# Patient Record
Sex: MALE | Race: WHITE | NOT HISPANIC OR LATINO | Employment: OTHER | URBAN - NONMETROPOLITAN AREA
[De-identification: names, ages, dates, MRNs, and addresses within clinical notes are randomized per-mention and may not be internally consistent; named-entity substitution may affect disease eponyms.]

---

## 2020-01-07 ENCOUNTER — OFFICE VISIT (OUTPATIENT)
Dept: CARDIOLOGY | Facility: CLINIC | Age: 65
End: 2020-01-07

## 2020-01-07 VITALS
WEIGHT: 191.2 LBS | HEIGHT: 68 IN | HEART RATE: 67 BPM | OXYGEN SATURATION: 95 % | DIASTOLIC BLOOD PRESSURE: 70 MMHG | BODY MASS INDEX: 28.98 KG/M2 | SYSTOLIC BLOOD PRESSURE: 112 MMHG

## 2020-01-07 DIAGNOSIS — R06.02 SHORTNESS OF BREATH: ICD-10-CM

## 2020-01-07 DIAGNOSIS — I87.2 VENOUS INSUFFICIENCY OF BOTH LOWER EXTREMITIES: ICD-10-CM

## 2020-01-07 DIAGNOSIS — I10 ESSENTIAL HYPERTENSION: ICD-10-CM

## 2020-01-07 DIAGNOSIS — Z94.0 KIDNEY TRANSPLANT STATUS, LIVING UNRELATED DONOR: ICD-10-CM

## 2020-01-07 DIAGNOSIS — R00.2 PALPITATIONS: Primary | ICD-10-CM

## 2020-01-07 DIAGNOSIS — D68.62 LA (LUPUS ANTICOAGULANT) DISORDER (HCC): ICD-10-CM

## 2020-01-07 PROCEDURE — 99204 OFFICE O/P NEW MOD 45 MIN: CPT | Performed by: NURSE PRACTITIONER

## 2020-01-07 RX ORDER — IRBESARTAN 300 MG/1
75-150 TABLET ORAL NIGHTLY
COMMUNITY

## 2020-01-07 RX ORDER — SIMVASTATIN 80 MG
TABLET ORAL NIGHTLY
COMMUNITY

## 2020-01-07 RX ORDER — SIROLIMUS 2 MG/1
2 TABLET, FILM COATED ORAL DAILY
COMMUNITY

## 2020-01-07 RX ORDER — PRAVASTATIN SODIUM 40 MG
40 TABLET ORAL DAILY
COMMUNITY
End: 2020-01-07 | Stop reason: ALTCHOICE

## 2020-01-07 NOTE — PROGRESS NOTES
Subjective:     Encounter Date:01/07/2020    Chief Complaint:    Patient ID: Harrsion Perry is a 64 y.o. male here today for cardiac evaluation at the request of Dr. Narayan for new onset atrial fibrillation.    Feels irregular (hard) heartbeat usually when he is lying down for the last 6 months or so. Sometimes feels fast at rest and with activity. Some SOA and dizziness. Feels like balance has been off for over a year. Has been on warfarin for lupus anticoagulant. Hx CVA 30 years ago. Had an echocardiogram 30 years ago - no PFO. Had kidney transplant 2004.    Atrial Fibrillation   Presents for initial visit. Symptoms include dizziness, hypertension, palpitations, shortness of breath and tachycardia. Symptoms are negative for chest pain, hemodynamic instability and syncope. The symptoms have been stable. Past treatments include beta blockers and warfarin. Compliance with prior treatments has been good. Past medical history includes atrial fibrillation and HTN. There is no history of CABG/stent, CAD, CHF, hyperlipidemia and valvular heart disease.     History:   Past Medical History:   Diagnosis Date   • Benign prostatic hyperplasia with lower urinary tract symptoms    • Cyst of kidney, acquired    • Depression    • Dyshidrosis    • Edema    • Erectile dysfunction    • Hyperlipidemia    • Hypertension    • LA (lupus anticoagulant) disorder (CMS/HCC)    • Parathyroid disease (CMS/HCC)    • Pleurodynia    • Polycythemia    • Restless legs syndrome    • Stroke (CMS/HCC)      Past Surgical History:   Procedure Laterality Date   • CHOLECYSTECTOMY     • PARATHYROIDECTOMY      x2, [facility] and [facility]   • TRANSPLANTATION RENAL  07/2004    Dr. Marlene Bertrand     Social History     Socioeconomic History   • Marital status: Single     Spouse name: Not on file   • Number of children: Not on file   • Years of education: Not on file   • Highest education level: Not on file   Tobacco Use   • Smoking status: Current Every Day  Smoker     Years: 46.00     Types: Pipe, Cigarettes, Cigars     Start date: 1972   • Smokeless tobacco: Never Used   • Tobacco comment: smoked cigs 15 years less than 1 ppd - quit cigs (doesn't know when), smoked/chewed cigars for years and changed to pipe in 2019, smokes pipe 3 times per day. Has quit all tobacco for up to a year once.   Substance and Sexual Activity   • Alcohol use: Yes     Frequency: Never     Comment: rarely   • Drug use: Never   • Sexual activity: Defer     Family History   Problem Relation Age of Onset   • Heart disease Father    • Heart attack Father    • Heart failure Mother    • Lung cancer Maternal Grandfather         oat cell   • Heart attack Paternal Grandfather    • Heart disease Paternal Grandfather        Outpatient Medications Marked as Taking for the 1/7/20 encounter (Office Visit) with Agueda Soto APRN   Medication Sig Dispense Refill   • amitriptyline (ELAVIL) 100 MG tablet Take 100 mg by mouth Every Night.     • Calcium Carbonate-Vitamin D 600-125 MG-UNIT tablet Take 1 tablet by mouth Daily.     • famotidine (PEPCID) 20 MG tablet Take 20 mg by mouth 2 (Two) Times a Day.     • irbesartan (AVAPRO) 300 MG tablet Take 75 mg by mouth Every Night.     • mycophenolate (CELLCEPT) 500 MG tablet Take 500 mg by mouth 2 (Two) Times a Day.     • predniSONE (DELTASONE) 5 MG tablet Take 5 mg by mouth Daily.     • SIMVASTATIN PO Take  by mouth Every Night.     • sirolimus (RAPAMUNE) 2 MG tablet Take 2 mg by mouth Daily.     • warfarin (COUMADIN) 5 MG tablet Take 5 mg by mouth Daily.     • [DISCONTINUED] carvedilol (COREG) 6.25 MG tablet Take 1.625 mg by mouth 2 (Two) Times a Day With Meals.     • [DISCONTINUED] pravastatin (PRAVACHOL) 40 MG tablet Take 40 mg by mouth Daily.     Didn't bring updated med list or meds. Not sure on meds/doses.    Review of Systems:  Review of Systems   Constitution: Positive for malaise/fatigue. Negative for chills and fever.   HENT: Negative for  "congestion and nosebleeds.    Eyes: Negative for blurred vision and double vision.   Cardiovascular: Positive for dyspnea on exertion, irregular heartbeat, leg swelling (R worse than L and if sitting or standing) and palpitations. Negative for chest pain, near-syncope and syncope.   Respiratory: Positive for cough (from a cold recently but normally doesn't cough), shortness of breath and sputum production (white to light yellow, improving). Negative for snoring.    Hematologic/Lymphatic: Bruises/bleeds easily (bruises easily on warfarin and prednisone).   Skin: Negative for rash.   Musculoskeletal: Positive for arthritis, back pain and joint pain.   Gastrointestinal: Negative for abdominal pain, constipation, diarrhea, heartburn, hematemesis, hematochezia, hemorrhoids and melena.   Genitourinary: Negative for hematuria and nocturia.   Neurological: Positive for dizziness, light-headedness and loss of balance. Negative for excessive daytime sleepiness and headaches.   Psychiatric/Behavioral: Negative for depression. The patient does not have insomnia and is not nervous/anxious.         Objective:   /70 (BP Location: Right arm, Patient Position: Sitting, Cuff Size: Adult)   Pulse 67   Ht 172.7 cm (68\")   Wt 86.7 kg (191 lb 3.2 oz)   SpO2 95%   BMI 29.07 kg/m²   Wt Readings from Last 3 Encounters:   01/07/20 86.7 kg (191 lb 3.2 oz)        Physical Exam   Constitutional: He is oriented to person, place, and time. He appears well-developed and well-nourished.   HENT:   Head: Normocephalic and atraumatic.   Right Ear: External ear normal.   Left Ear: External ear normal.   Nose: Nose normal.   Mouth/Throat: Oropharynx is clear and moist.   Eyes: Pupils are equal, round, and reactive to light. Conjunctivae and EOM are normal. Right eye exhibits no discharge. Left eye exhibits no discharge. No scleral icterus.   Neck: Normal range of motion. Neck supple. No JVD present. No tracheal deviation present. No " thyromegaly present.   Cardiovascular: Normal rate and regular rhythm. Frequent extrasystoles are present. Exam reveals no gallop and no friction rub.   No murmur heard.  Pulmonary/Chest: Effort normal and breath sounds normal. He has no wheezes. He has no rales.   Abdominal: Soft. Bowel sounds are normal. He exhibits no mass. There is no tenderness. There is no rebound and no guarding.   Musculoskeletal: He exhibits no edema, tenderness or deformity.   Lymphadenopathy:     He has no cervical adenopathy.   Neurological: He is alert and oriented to person, place, and time. No cranial nerve deficit.   Skin: Skin is dry.   Changes of chronic venous insufficiency to bilateral lower extremities.  Hands are cool.   Psychiatric: He has a normal mood and affect. His behavior is normal. Judgment and thought content normal.   Vitals reviewed.    Lab/Diagnostics Review:   11/18/2019 EKG [facility] sinus tachycardia with frequent PACs 106 bpm per my review no previous available for comparison.    No labs or previous cardiac studies available for review    Procedures: none in office today        Assessment/Plan:         Problem List Items Addressed This Visit        Cardiovascular and Mediastinum    Palpitations - Primary    Current Assessment & Plan     Check extended Holter to determine if any PAF and rate control and check echocardiogram. EKG from [facility] appears to be sinus tachycardia with frequent PACs. He is already anticoagulated with warfarin for lupus anticoagulant.  Requested copy of labs for review.  We will need to check TSH if not done recently.         Relevant Orders    Adult Transthoracic Echo Complete W/ Cont if Necessary Per Protocol    Holter Monitor - 72 Hour Up To 21 Days    Hypertension    Current Assessment & Plan     Well controlled with irbesartan. Continue present therapy.           Relevant Medications    irbesartan (AVAPRO) 300 MG tablet    Venous insufficiency of both lower extremities       Immune and  Lymphatic    LA (lupus anticoagulant) disorder (CMS/HCC)       Other    Kidney transplant status, living unrelated donor      Other Visit Diagnoses     Shortness of breath        Relevant Orders    Adult Transthoracic Echo Complete W/ Cont if Necessary Per Protocol        Return in about 2 months (around 3/7/2020) for Recheck.           Agueda Soto, APRN, ACNP-BC, CHFN-BC

## 2020-01-07 NOTE — ASSESSMENT & PLAN NOTE
Check extended Holter to determine if any PAF and rate control and check echocardiogram. EKG from [facility] appears to be sinus tachycardia with frequent PACs. He is already anticoagulated with warfarin for lupus anticoagulant.  Requested copy of labs for review.  We will need to check TSH if not done recently.

## 2020-01-10 DIAGNOSIS — R00.2 PALPITATIONS: Primary | ICD-10-CM

## 2020-01-12 ENCOUNTER — RESULTS ENCOUNTER (OUTPATIENT)
Dept: CARDIOLOGY | Facility: CLINIC | Age: 65
End: 2020-01-12

## 2020-01-12 DIAGNOSIS — R00.2 PALPITATIONS: ICD-10-CM

## 2020-01-13 ENCOUNTER — OUTSIDE FACILITY SERVICE (OUTPATIENT)
Dept: CARDIOLOGY | Facility: CLINIC | Age: 65
End: 2020-01-13

## 2020-01-13 PROCEDURE — 93306 TTE W/DOPPLER COMPLETE: CPT | Performed by: INTERNAL MEDICINE

## 2020-01-31 DIAGNOSIS — R07.89 OTHER CHEST PAIN: Primary | ICD-10-CM

## 2020-01-31 DIAGNOSIS — I47.1 PAROXYSMAL SVT (SUPRAVENTRICULAR TACHYCARDIA) (HCC): ICD-10-CM

## 2020-02-17 ENCOUNTER — OUTSIDE FACILITY SERVICE (OUTPATIENT)
Dept: CARDIOLOGY | Facility: CLINIC | Age: 65
End: 2020-02-17

## 2020-02-17 PROCEDURE — 93350 STRESS TTE ONLY: CPT | Performed by: INTERNAL MEDICINE

## 2020-02-17 PROCEDURE — 93018 CV STRESS TEST I&R ONLY: CPT | Performed by: INTERNAL MEDICINE

## 2020-02-18 DIAGNOSIS — R07.89 OTHER CHEST PAIN: ICD-10-CM

## 2020-03-10 ENCOUNTER — OFFICE VISIT (OUTPATIENT)
Dept: CARDIOLOGY | Facility: CLINIC | Age: 65
End: 2020-03-10

## 2020-03-10 VITALS
HEART RATE: 65 BPM | OXYGEN SATURATION: 98 % | WEIGHT: 195.8 LBS | SYSTOLIC BLOOD PRESSURE: 104 MMHG | DIASTOLIC BLOOD PRESSURE: 80 MMHG | HEIGHT: 68 IN | BODY MASS INDEX: 29.67 KG/M2

## 2020-03-10 DIAGNOSIS — R00.2 PALPITATIONS: ICD-10-CM

## 2020-03-10 DIAGNOSIS — I87.2 VENOUS INSUFFICIENCY OF BOTH LOWER EXTREMITIES: ICD-10-CM

## 2020-03-10 DIAGNOSIS — I47.1 PAROXYSMAL SVT (SUPRAVENTRICULAR TACHYCARDIA) (HCC): Primary | ICD-10-CM

## 2020-03-10 DIAGNOSIS — R07.89 OTHER CHEST PAIN: ICD-10-CM

## 2020-03-10 DIAGNOSIS — I47.29 NONSUSTAINED VENTRICULAR TACHYCARDIA (HCC): ICD-10-CM

## 2020-03-10 DIAGNOSIS — I10 ESSENTIAL HYPERTENSION: ICD-10-CM

## 2020-03-10 PROCEDURE — 93000 ELECTROCARDIOGRAM COMPLETE: CPT | Performed by: NURSE PRACTITIONER

## 2020-03-10 PROCEDURE — 99213 OFFICE O/P EST LOW 20 MIN: CPT | Performed by: NURSE PRACTITIONER

## 2020-03-10 RX ORDER — OMEPRAZOLE 20 MG/1
20 CAPSULE, DELAYED RELEASE ORAL DAILY
COMMUNITY

## 2020-03-10 NOTE — ASSESSMENT & PLAN NOTE
Well controlled with irbesartan. May need to stop irbesartan and restart beta-blocker. Will await Dr. Major's recommendations later this week.

## 2020-03-10 NOTE — PATIENT INSTRUCTIONS
Supraventricular Tachycardia, Adult  Supraventricular tachycardia (SVT) is a type of abnormal heart rhythm. It causes the heart to beat very quickly and then return to a normal speed.  A normal resting heart rate is  beats per minute. During an episode of SVT, your heart rate may be higher than 150 beats per minute. Episodes of SVT can be frightening, but they are usually not dangerous. However, if episodes happen several times per day or last longer than a few seconds, they may lead to heart failure.  What are the causes?    Usually, a normal heartbeat starts when an area called the sinoatrial node releases an electrical signal. In SVT, other areas of the heart send out electrical signals that interfere with the signal from the sinoatrial node. It is not known why some people get SVT and others do not.  What increases the risk?  You are more likely to develop this condition if you are:  · 12-30 years old.  · A woman.  The following factors may make you more likely to develop this condition:  · Stress.  · Tiredness.  · Smoking.  · Stimulant drugs, such as cocaine and methamphetamine.  · Alcohol.  · Caffeine.  · Pregnancy.  · Anxiety.  What are the signs or symptoms?  Symptoms of this condition include:  · A pounding heart.  · A feeling that the heart is skipping beats (palpitations).  · Weakness.  · Shortness of breath.  · Tightness or pain in your chest.  · Light-headedness.  · Anxiety.  · Dizziness.  · Sweating.  · Nausea.  · Fainting.  · Fatigue or tiredness.  A mild episode may not cause symptoms.  How is this diagnosed?  This condition may be diagnosed based on:  · Your symptoms.  · A physical exam.  ? If you have an episode of SVT during the exam, the health care provider may be able to diagnose SVT by listening to your heart and feeling your pulse.  · Tests. These may include:  ? An electrocardiogram (ECG). This test is done to check for problems with electrical activity in the heart.  ? A Holter  monitor or event monitor test. This test involves wearing a portable device that monitors your heart rate over time.  ? An echocardiogram. This test involves taking an image of your heart using sound waves. It is done to rule out other causes of a fast heart rate.  ? Blood tests.  How is this treated?  This condition may be treated with:  · Vagal nerve stimulation. The treatment involves stimulating your vagus nerve, which slows down the heart. It is often the first and only treatment that is needed for this condition. Work with your health care provider to find which one works best for you. Ways to do this treatment include:  ? Holding your breath and pushing, as though you are having a bowel movement.  ? Massaging an area on one side of your neck, below your jaw. Do not try this yourself. Only a health care provider should do this. If done the wrong way, it can lead to a stroke.  ? Bending forward with your head between your legs.  ? Coughing while bending forward with your head between your legs.  ? Closing your eyes and massaging your eyeballs. A health care provider should guide you through this method before you try it on your own.  · Medicines that prevent attacks.  · Medicine to stop an attack. The medicine is given through an IV at the hospital.  · A small electric shock (cardioversion) that stops an attack. Before you get the shock, you will get medicine to make you fall asleep.  · Radiofrequency ablation. In this procedure, a small, thin tube (catheter) is used to send radiofrequency energy to the area of tissue that is causing the rapid heartbeats. The energy kills the cells and helps your heart keep a normal rhythm. You may have this treatment if you have symptoms of SVT often.  If you do not have symptoms, you may not need treatment.  Follow these instructions at home:  Stress  · Avoid stressful situations when possible.  · Find healthy ways of managing stress that work for you. Some healthy ways to  manage stress include:  ? Taking part in relaxing activities, such as yoga, meditation, or being out in nature.  ? Listening to relaxing music.  ? Practicing relaxation techniques, such as deep breathing.  ? Leading a healthy lifestyle. This involves getting plenty of sleep, exercising, and eating a balanced diet.  ? Attending counseling or talk therapy with a mental health professional.  Lifestyle    · Try to get at least 7 hours of sleep each night.  · Do not use any products that contain nicotine or tobacco, such as cigarettes, e-cigarettes, and chewing tobacco. If you need help quitting, ask your health care provider.  · Be aware of how alcohol affects your condition. If alcohol:  ? Triggers episodes of SVT, do not drink alcohol.  ? Does not seem to trigger episodes, limit alcohol intake to no more than 1 drink a day for nonpregnant women and 2 drinks a day for men. Be aware of how much alcohol is in your drink. In the [place] one drink equals one 12 oz bottle of beer (355 mL), one 5 oz glass of wine (148 mL), or one 1½ oz glass of hard liquor (44 mL).  · Be aware of how caffeine affects your condition. If caffeine:  ? Triggers episodes of SVT, do not eat, drink, or use anything with caffeine in it.  ? Does not seem to trigger episodes, consume caffeine in moderation.  · Do not use stimulant drugs. If you need help quitting, talk with your health care provider.  General instructions  · Maintain a healthy weight.  · Exercise regularly. Ask your health care provider to suggest some good activities for you. Aim for one or a combination of the following:  ? 150 minutes per week of moderate exercise, such as walking or yoga.  ? 75 minutes per week of vigorous exercise, such as running or swimming.  · Perform vagus nerve stimulation as directed by your health care provider.  · Take over-the-counter and prescription medicines only as told by your health care provider.  · Keep all follow-up visits as told by your health  care provider. This is important.  Contact a health care provider if:  · You have episodes of SVT more often than before.  · Episodes of SVT last longer than before.  · Vagus nerve stimulation is no longer helping.  · You have new symptoms.  Get help right away if:  · You have chest pain.  · Your symptoms get worse.  · You have trouble breathing.  · You have an episode of SVT that lasts longer than 20 minutes.  · You faint.  These symptoms may represent a serious problem that is an emergency. Do not wait to see if the symptoms will go away. Get medical help right away. Call your local emergency services (911 in the [place] Do not drive yourself to the hospital.  Summary  · Supraventricular tachycardia (SVT) is a type of abnormal heart rhythm.  · During an episode of SVT, your heart rate may be higher than 150 beats per minute.  · Treatment depends on frequency of occurrence and symptoms experienced.  This information is not intended to replace advice given to you by your health care provider. Make sure you discuss any questions you have with your health care provider.  Document Released: 12/18/2006 Document Revised: 11/05/2019 Document Reviewed: 11/05/2019  [employer] Interactive Patient Education © 2020 [employer]

## 2020-03-10 NOTE — ASSESSMENT & PLAN NOTE
Due to PACs, SVT, and NSVT. Await Dr. Major's recommendations. Avoid stimulants, decongestants. Check TSH as previously ordered.

## 2020-03-10 NOTE — ASSESSMENT & PLAN NOTE
Await Dr. Major's recommendations. Avoid stimulants, decongestants. Check TSH as previously ordered. Reprinted order. Provided education.

## 2020-03-10 NOTE — PROGRESS NOTES
Subjective:     Encounter Date:03/10/2020    Chief Complaint:    Patient ID: Harrison Perry is a 64 y.o. male here today for 2 month cardiac follow-up.  He does not know if he had TSH done as ordered in January.    HPI     Palpitations      Additional comments: Numerous episodes of SVT per extended Holter. Sees Dr. Major Friday at [facility], his kidney transplant specialist (Dr. Rosario) is reportedly ok with stopping ARB and restarting carvedilol - had fewer palpitations while on carvedilol but decreased due to hypotension (100/50s). SE and echo were unremarkable.          Last edited by Agueda Soto APRN on 3/10/2020  1:12 PM. (History)        History:   Past Medical History:   Diagnosis Date   • Benign prostatic hyperplasia with lower urinary tract symptoms    • Cyst of kidney, acquired    • Depression    • Dyshidrosis    • Edema    • Erectile dysfunction    • Hyperlipidemia    • Hypertension    • LA (lupus anticoagulant) disorder (CMS/HCC)    • Nonsustained ventricular tachycardia (CMS/HCC)    • Parathyroid disease (CMS/HCC)    • Pleurodynia    • Polycythemia    • PSVT (paroxysmal supraventricular tachycardia) (CMS/HCC)    • Restless legs syndrome    • Stroke (CMS/HCC)      Past Surgical History:   Procedure Laterality Date   • CHOLECYSTECTOMY     • PARATHYROIDECTOMY      x2, [facility] and [facility]   • [place]     Social History     Socioeconomic History   • Marital status: Single     Spouse name: Not on file   • Number of children: Not on file   • Years of education: Not on file   • Highest education level: Not on file   Tobacco Use   • Smoking status: Current Every Day Smoker     Years: 46.00     Types: Pipe, Cigarettes, Cigars     Start date: 1972   • Smokeless tobacco: Never Used   • Tobacco comment: smoked cigs 15 years less than 1 ppd - quit cigs (doesn't know when), smoked/chewed cigars for years and changed to pipe in 2019, smokes pipe 3 times per day.  Has quit all tobacco for up to a year once.   Substance and Sexual Activity   • Alcohol use: Not Currently     Frequency: Never     Comment: rarely in the past   • Drug use: Never   • Sexual activity: Defer     Family History   Problem Relation Age of Onset   • Heart disease Father    • Heart attack Father    • Heart failure Mother    • Lung cancer Maternal Grandfather         oat cell   • Heart attack Paternal Grandfather    • Heart disease Paternal Grandfather      Outpatient Medications Marked as Taking for the 3/10/20 encounter (Office Visit) with Agueda Soto APRN   Medication Sig Dispense Refill   • amitriptyline (ELAVIL) 100 MG tablet Take 100 mg by mouth Every Night.     • Calcium Carbonate-Vitamin D 600-125 MG-UNIT tablet Take 1 tablet by mouth Daily.     • irbesartan (AVAPRO) 300 MG tablet Take 75 mg by mouth Every Night.     • mycophenolate (CELLCEPT) 500 MG tablet Take 500 mg by mouth 2 (Two) Times a Day.     • omeprazole (priLOSEC) 20 MG capsule Take 20 mg by mouth Daily.     • predniSONE (DELTASONE) 5 MG tablet Take 5 mg by mouth Daily.     • SIMVASTATIN PO Take  by mouth Every Night.     • sirolimus (RAPAMUNE) 2 MG tablet Take 2 mg by mouth Daily.     • warfarin (COUMADIN) 4 MG tablet Take 4 mg by mouth Daily. Regulated by Dr. Leonid Narayan     • [DISCONTINUED] famotidine (PEPCID) 20 MG tablet Take 20 mg by mouth 2 (Two) Times a Day.       Review of Systems:  Review of Systems   Constitution: Positive for malaise/fatigue (unchanged). Negative for chills, fever and weight gain.   HENT: Negative for congestion and nosebleeds.    Eyes: Negative for blurred vision and double vision.   Cardiovascular: Positive for chest pain (random, similar to indigestion/GERD - low risk SE), dyspnea on exertion (walking a distance or carrying heavy object), irregular heartbeat, leg swelling (R worse than L and if sitting or standing) and palpitations. Negative for near-syncope, orthopnea, paroxysmal nocturnal  "dyspnea and syncope.   Respiratory: Positive for cough (from a cold recently but normally doesn't cough) and shortness of breath. Negative for snoring and sputum production.    Hematologic/Lymphatic: Bruises/bleeds easily (bruises easily on warfarin and prednisone).   Skin: Negative for rash.   Musculoskeletal: Positive for arthritis, back pain and joint pain.   Gastrointestinal: Negative for abdominal pain, constipation, diarrhea, heartburn, hematemesis, hematochezia, hemorrhoids and melena.   Genitourinary: Negative for hematuria and nocturia.   Neurological: Positive for dizziness (when first stands up) and loss of balance. Negative for excessive daytime sleepiness, headaches and light-headedness.   Psychiatric/Behavioral: Positive for memory loss (mild). Negative for depression. The patient does not have insomnia and is not nervous/anxious.         Objective:   /80 (BP Location: Left arm, Patient Position: Sitting, Cuff Size: Adult)   Pulse 65   Ht 172.7 cm (68\")   Wt 88.8 kg (195 lb 12.8 oz)   SpO2 98%   BMI 29.77 kg/m²   Wt Readings from Last 3 Encounters:   03/10/20 88.8 kg (195 lb 12.8 oz)   01/07/20 86.7 kg (191 lb 3.2 oz)       Physical Exam   Constitutional: He is oriented to person, place, and time. He appears well-developed and well-nourished.   Eyes: No scleral icterus.   Neck: No JVD present.   Cardiovascular: Normal rate, normal heart sounds and intact distal pulses. A regularly irregular rhythm present. Frequent extrasystoles are present. Exam reveals no gallop and no friction rub.   No murmur heard.  Pulmonary/Chest: Effort normal and breath sounds normal.   Musculoskeletal: He exhibits no edema.   Neurological: He is alert and oriented to person, place, and time.   Skin: Skin is warm and dry.   Changes of venous insufficiency to distal BLEs   Psychiatric: He has a normal mood and affect.   Vitals reviewed.    Lab/Diagnostics Review:   2/17/2020 stress echocardiogram [facility] Dr. Digna barone " risk for ischemia    1/13/2020 extended Holter monitor [facility]  Dr. Sawyer   · Frequent atrial ectopic beats with numerous runs of SVT.  · Rare ventricular ectopic beats with 3 runs of nonsustained VT up to 6 beats.  · Patient symptoms were associated with SVT (chest pain correlated with PACs, PVCs, and SVT).     1/13/2020 echocardiogram [facility] Dr. Sawyer EF 55 to 60%, mild MR, normal RV size & function.    2/13/2020  CBC WBC 7400 hemoglobin 13.8 hematocrit 44% platelets 330,000  CMP sodium 142 potassium 4.4 chloride 106 CO2 26 BUN 11 creatinine 1.19 calcium 9.9 glucose 105 total bilirubin 0.5 albumin 4.3 total protein 7.0 alk phos 158 AST 23 ALT 21  Magnesium 1.8 phosphorus 2.3  Lipid panel total cholesterol 161 triglycerides 97 HDL 59 LDL 83  Vitamin D 45    11/18/2019 EKG [facility] sinus tachycardia with frequent PACs 106 bpm per my review no previous available for comparison.       ECG 12 Lead  Date/Time: 3/10/2020 11:24 AM  Performed by: Agueda Soto APRN  Authorized by: Agueda Soto APRN   Comparison: compared with previous ECG from 11/18/2019  Similar to previous ECG  Rhythm: sinus tachycardia and supraventricular tachycardia  Ectopy: atrial premature contractions  Rate: tachycardic  BPM: 108    Clinical impression: abnormal EKG              Assessment/Plan:       Problem List Items Addressed This Visit        Cardiovascular and Mediastinum    Hypertension (Chronic)    Current Assessment & Plan     Well controlled with irbesartan. May need to stop irbesartan and restart beta-blocker. Will await Dr. Major's recommendations later this week.         Nonsustained ventricular tachycardia (CMS/HCC)    Palpitations (Chronic)    Current Assessment & Plan     Due to PACs, SVT, and NSVT. Await Dr. Major's recommendations. Avoid stimulants, decongestants. Check TSH as previously ordered.         Paroxysmal SVT (supraventricular tachycardia) (CMS/HCC) - Primary    Current Assessment & Plan      Await Dr. Major's recommendations. Avoid stimulants, decongestants. Check TSH as previously ordered. Reprinted order. Provided education.         Relevant Orders    ECG 12 Lead    Venous insufficiency of both lower extremities    Current Assessment & Plan     Encouraged use of compression during daytime and elevation when not ambulating.            Nervous and Auditory    Other chest pain    Current Assessment & Plan     Non-ischemic but correlated with ectopy.             Return in about 6 months (around 9/10/2020) for Recheck.         Agueda Soto, APRN, ACNP-BC, CHFN-BC

## 2020-10-23 ENCOUNTER — OFFICE VISIT (OUTPATIENT)
Dept: CARDIOLOGY | Facility: CLINIC | Age: 65
End: 2020-10-23

## 2020-10-23 VITALS
HEART RATE: 87 BPM | BODY MASS INDEX: 28.43 KG/M2 | OXYGEN SATURATION: 98 % | SYSTOLIC BLOOD PRESSURE: 140 MMHG | DIASTOLIC BLOOD PRESSURE: 78 MMHG | TEMPERATURE: 97.3 F | WEIGHT: 187.6 LBS | HEIGHT: 68 IN

## 2020-10-23 DIAGNOSIS — I10 ESSENTIAL HYPERTENSION: Chronic | ICD-10-CM

## 2020-10-23 DIAGNOSIS — Z72.0 TOBACCO ABUSE: Chronic | ICD-10-CM

## 2020-10-23 DIAGNOSIS — I47.29 NONSUSTAINED VENTRICULAR TACHYCARDIA (HCC): Chronic | ICD-10-CM

## 2020-10-23 DIAGNOSIS — I47.1 PAROXYSMAL SVT (SUPRAVENTRICULAR TACHYCARDIA) (HCC): Primary | Chronic | ICD-10-CM

## 2020-10-23 PROCEDURE — 99214 OFFICE O/P EST MOD 30 MIN: CPT | Performed by: NURSE PRACTITIONER

## 2020-10-23 NOTE — ASSESSMENT & PLAN NOTE
He is not willing to quit smoking his pipe. Discussed risks of continued use and encouraged cessation.

## 2020-10-23 NOTE — ASSESSMENT & PLAN NOTE
Fair control with irbesartan and beta-blocker. Advised him to check with kidney transplant specialist for goal BP. Try to avoid hypotension.

## 2020-10-23 NOTE — ASSESSMENT & PLAN NOTE
Stable/improved with resumption of low dose beta-blocker per Dr. Major 3/2020. Continue present therapy.

## 2020-10-23 NOTE — PROGRESS NOTES
"    Subjective:     Encounter Date:10/23/2020    Chief Complaint:    Patient ID: Harrison Perry is a 64 y.o. male here today for 6 month cardiac follow-up.    HPI     Rapid Heart Rate      Additional comments: Hx PSVT (very frequent but minimal symptoms), NSVT, and PACs. Dr. Major started him on low dose metoprolol which has helped tremendously (had hypotension on carvedilol in the past)              Hypertension      Additional comments: no further hypotension, home readings 130s/60-70s - adjusts irbesartan between 75 and 150 mg based on BP reading              Nicotine Dependence      Additional comments: smokes a pipe once a day \"doesn't inhale\"          Last edited by Agueda Soto APRN on 10/23/2020 12:35 PM. (History)        History:   Past Medical History:   Diagnosis Date   • Benign prostatic hyperplasia with lower urinary tract symptoms    • Cyst of kidney, acquired    • Depression    • Dyshidrosis    • Edema    • Erectile dysfunction    • Hyperlipidemia    • Hypertension    • LA (lupus anticoagulant) disorder (CMS/HCC)    • Nonsustained ventricular tachycardia (CMS/HCC)    • Parathyroid disease (CMS/HCC)    • Pleurodynia    • Polycythemia    • PSVT (paroxysmal supraventricular tachycardia) (CMS/HCC)    • Restless legs syndrome    • Stroke (CMS/HCC)      Past Surgical History:   Procedure Laterality Date   • CHOLECYSTECTOMY     • PARATHYROIDECTOMY      x2, [facility] and [facility]   • TRANSPLANTATION RENAL  07/2004    Dr. Marlene Bertrand     Social History     Socioeconomic History   • Marital status: Single     Spouse name: Not on file   • Number of children: Not on file   • Years of education: Not on file   • Highest education level: Not on file   Tobacco Use   • Smoking status: Current Every Day Smoker     Years: 46.00     Types: Pipe, Cigarettes, Cigars     Start date: 1972   • Smokeless tobacco: Never Used   • Tobacco comment: smoked cigs 15 years less than 1 ppd - quit cigs (doesn't know when), " smoked/chewed cigars for years and changed to pipe in 2019, smokes pipe 3 times per day. Has quit all tobacco for up to a year once.   Substance and Sexual Activity   • Alcohol use: Not Currently     Frequency: Monthly or less     Drinks per session: 1 or 2     Binge frequency: Never     Comment: rarely in the past   • Drug use: Never   • Sexual activity: Defer     Family History   Problem Relation Age of Onset   • Heart disease Father    • Heart attack Father    • Heart failure Mother    • Lung cancer Maternal Grandfather         oat cell   • Heart attack Paternal Grandfather    • Heart disease Paternal Grandfather      Outpatient Medications Marked as Taking for the 10/23/20 encounter (Office Visit) with Agueda Soto APRN   Medication Sig Dispense Refill   • alprostadil (MUSE) 125 MCG pellet 125 mcg by Transurethral route As Needed for Erectile Dysfunction. use no more than 3 times per week     • Calcium Carbonate-Vitamin D 600-125 MG-UNIT tablet Take 1 tablet by mouth Daily.     • irbesartan (AVAPRO) 300 MG tablet Take  mg by mouth Every Night.     • metoprolol tartrate (LOPRESSOR) 25 MG tablet Take 12.5 mg by mouth 2 (Two) Times a Day.     • mycophenolate (CELLCEPT) 500 MG tablet Take 500 mg by mouth 2 (Two) Times a Day.     • omeprazole (priLOSEC) 20 MG capsule Take 20 mg by mouth Daily.     • predniSONE (DELTASONE) 5 MG tablet Take 5 mg by mouth Daily.     • simvastatin (ZOCOR) 80 MG tablet Take  by mouth Every Night.     • sirolimus (RAPAMUNE) 2 MG tablet Take 2 mg by mouth Daily.     • warfarin (COUMADIN) 4 MG tablet Take 4 mg by mouth Daily. Regulated by Dr. Leonid Narayan       Review of Systems:  Review of Systems   Constitution: Positive for malaise/fatigue (unchanged). Negative for chills, fever and weight gain.   HENT: Negative for congestion and nosebleeds.    Eyes: Negative for blurred vision and double vision.   Cardiovascular: Positive for dyspnea on exertion (walking a distance or  "carrying heavy object) and leg swelling (R worse than L and if sitting or standing - about the same - not every day - compression helps). Negative for chest pain (no further random, similar to indigestion/GERD - low risk SE ), irregular heartbeat, near-syncope, orthopnea, palpitations, paroxysmal nocturnal dyspnea and syncope.   Respiratory: Positive for shortness of breath. Negative for cough, snoring and sputum production.    Hematologic/Lymphatic: Bruises/bleeds easily (bruises easily on warfarin and prednisone).   Skin: Negative for rash.   Musculoskeletal: Positive for arthritis, back pain and joint pain.   Gastrointestinal: Negative for abdominal pain, constipation, diarrhea, heartburn, hematemesis, hematochezia, hemorrhoids and melena.   Genitourinary: Positive for nocturia. Negative for hematuria.   Neurological: Positive for dizziness (when first stands up sometimes), light-headedness and loss of balance. Negative for excessive daytime sleepiness and headaches.   Psychiatric/Behavioral: Positive for memory loss (mild, about the same). Negative for depression. The patient has insomnia (worse since stopping amitryptilline). The patient is not nervous/anxious.         Objective:   /78 (BP Location: Left arm, Patient Position: Sitting, Cuff Size: Adult)   Pulse 87   Temp 97.3 °F (36.3 °C) (Infrared)   Ht 172.7 cm (68\")   Wt 85.1 kg (187 lb 9.6 oz)   SpO2 98%   BMI 28.52 kg/m²   Wt Readings from Last 3 Encounters:   10/23/20 85.1 kg (187 lb 9.6 oz)   03/10/20 88.8 kg (195 lb 12.8 oz)   01/07/20 86.7 kg (191 lb 3.2 oz)       Vitals signs reviewed.   Constitutional:       Appearance: Well-developed.   Eyes:      General: No scleral icterus.  Neck:      Vascular: No JVD.   Pulmonary:      Effort: Pulmonary effort is normal.      Breath sounds: Normal breath sounds.   Cardiovascular:      Normal rate. Frequent ectopic beats. Irregularly irregular rhythm.      No gallop.      Comments: Chronic changes of " venous insufficiency to BLEs  Pulses:     Intact distal pulses.   Skin:     General: Skin is warm and dry.      Findings: Ecchymosis (diffuse to BUEs) present.   Neurological:      Mental Status: Alert and oriented to person, place, and time.       Lab/Diagnostics Review:   8/13/2020 [facility]  CBC WBC 6500 hemoglobin 14.5 hematocrit 47% platelets 257,000  CMP sodium 144 potassium 3.3 chloride 106 CO2 26 BUN 13 creatinine 0.99 calcium 9.1 glucose 94 alk phos 143 AST 20 ALT 16 total protein 6.8 albumin 4.1 total bilirubin 0.5  Lipid panel total cholesterol 154 triglycerides 105 HDL 47 LDL 86  Magnesium 1.6  TSH 1.652 free T4 1.00 total T3 88  Vitamin D 45    2/17/2020 stress echocardiogram [facility] Dr. Sawyer low risk for ischemia     1/13/2020 extended Holter monitor [facility]  Dr. Sawyer   · Frequent atrial ectopic beats with numerous runs of SVT.  · Rare ventricular ectopic beats with 3 runs of nonsustained VT up to 6 beats.  · Patient symptoms were associated with SVT (chest pain correlated with PACs, PVCs, and SVT).      1/13/2020 echocardiogram [facility] Dr. Sawyer EF 55 to 60%, mild MR, normal RV size & function.     2/13/2020  CBC WBC 7400 hemoglobin 13.8 hematocrit 44% platelets 330,000  CMP sodium 142 potassium 4.4 chloride 106 CO2 26 BUN 11 creatinine 1.19 calcium 9.9 glucose 105 total bilirubin 0.5 albumin 4.3 total protein 7.0 alk phos 158 AST 23 ALT 21  Magnesium 1.8 phosphorus 2.3  Lipid panel total cholesterol 161 triglycerides 97 HDL 59 LDL 83  Vitamin D 45     11/18/2019 EKG [facility] sinus tachycardia with frequent PACs 106 bpm per my review no previous available for comparison.      ECG 12 Lead  Date/Time: 3/10/2020 11:24 AM  Performed by: Agueda Soto APRN  Authorized by: Agueda Soto APRN   Comparison: compared with previous ECG from 11/18/2019  Similar to previous ECG  Rhythm: sinus tachycardia and supraventricular tachycardia  Ectopy: atrial premature contractions  Rate:  tachycardic  BPM: 108  Clinical impression: abnormal EKG    Procedures      Assessment/Plan:       Problem List Items Addressed This Visit        Cardiovascular and Mediastinum    Hypertension (Chronic)    Current Assessment & Plan     Fair control with irbesartan and beta-blocker. Advised him to check with kidney transplant specialist for goal BP. Try to avoid hypotension.         Relevant Medications    metoprolol tartrate (LOPRESSOR) 25 MG tablet    Nonsustained ventricular tachycardia (CMS/HCC) (Chronic)    Current Assessment & Plan     Stable/improved with resumption of low dose beta-blocker per Dr. Major 3/2020. Continue present therapy.          Relevant Medications    metoprolol tartrate (LOPRESSOR) 25 MG tablet    Paroxysmal SVT (supraventricular tachycardia) (CMS/HCC) - Primary (Chronic)    Overview     Possible PAT vs PAF per Dr. Major. Anticoagulated for lupus anticoagulant.         Current Assessment & Plan     Relatively asymptomatic. Continue low dose beta-blocker. Continue to avoid stimulants and decongestants. TSH normal. Encouraged him to keep scheduled f/u with Dr. Major.         Relevant Medications    metoprolol tartrate (LOPRESSOR) 25 MG tablet       Other    Tobacco abuse (Chronic)    Current Assessment & Plan     He is not willing to quit smoking his pipe. Discussed risks of continued use and encouraged cessation.             Return in about 1 year (around 10/23/2021) for Recheck.         Agueda Soto, APRN, ACNP-BC, CHFN-BC

## 2020-10-23 NOTE — PATIENT INSTRUCTIONS
Hypomagnesemia  Hypomagnesemia is a condition in which the level of magnesium in the blood is low. Magnesium is a mineral that is found in many foods. It is used in many different processes in the body. Hypomagnesemia can affect every organ in the body. In severe cases, it can cause life-threatening problems.  What are the causes?  This condition may be caused by:  · Not getting enough magnesium in your diet.  · Malnutrition.  · Problems with absorbing magnesium from the intestines.  · Dehydration.  · Alcohol abuse.  · Vomiting.  · Severe or chronic diarrhea.  · Some medicines, including medicines that make you urinate more (diuretics).  · Certain diseases, such as kidney disease, diabetes, celiac disease, and overactive thyroid.  What are the signs or symptoms?  Symptoms of this condition include:  · Loss of appetite.  · Nausea and vomiting.  · Involuntary shaking or trembling of a body part (tremor).  · Muscle weakness.  · Tingling in the arms and legs.  · Sudden tightening of muscles (muscle spasms).  · Confusion.  · Psychiatric issues, such as depression, irritability, or psychosis.  · A feeling of fluttering of the heart.  · Seizures.  These symptoms are more severe if magnesium levels drop suddenly.  How is this diagnosed?  This condition may be diagnosed based on:  · Your symptoms and medical history.  · A physical exam.  · Blood and urine tests.  How is this treated?  Treatment depends on the cause and the severity of the condition. It may be treated with:  · A magnesium supplement. This can be taken in pill form. If the condition is severe, magnesium is usually given through an IV.  · Changes to your diet. You may be directed to eat foods that have a lot of magnesium, such as green leafy vegetables, peas, beans, and nuts.  · Stopping any intake of alcohol.  Follow these instructions at home:         · Make sure that your diet includes foods with magnesium. Foods that have a lot of magnesium in them  include:  ? Green leafy vegetables, such as spinach and broccoli.  ? Beans and peas.  ? Nuts and seeds, such as almonds and sunflower seeds.  ? Whole grains, such as whole grain bread and fortified cereals.  · Take magnesium supplements if your health care provider tells you to do that. Take them as directed.  · Take over-the-counter and prescription medicines only as told by your health care provider.  · Have your magnesium levels monitored as told by your health care provider.  · When you are active, drink fluids that contain electrolytes.  · Avoid drinking alcohol.  · Keep all follow-up visits as told by your health care provider. This is important.  Contact a health care provider if:  · You get worse instead of better.  · Your symptoms return.  Get help right away if you:  · Develop severe muscle weakness.  · Have trouble breathing.  · Feel that your heart is racing.  Summary  · Hypomagnesemia is a condition in which the level of magnesium in the blood is low.  · Hypomagnesemia can affect every organ in the body.  · Treatment may include eating more foods that contain magnesium, taking magnesium supplements, and not drinking alcohol.  · Have your magnesium levels monitored as told by your health care provider.  This information is not intended to replace advice given to you by your health care provider. Make sure you discuss any questions you have with your health care provider.  Document Released: 09/13/2006 Document Revised: 11/30/2018 Document Reviewed: 11/19/2018  [employer]vier Patient Education © 2020 [employer]    Potassium Content of Foods    Potassium is a mineral found in many foods and drinks. It affects how the heart works, and helps keep fluids and minerals balanced in the body.  The amount of potassium you need each day depends on your age and any medical conditions you may have. Talk to your health care provider or dietitian about how much potassium you need.  The following lists of foods provide the  general serving size for foods and the approximate amount of potassium in each serving, listed in milligrams (mg). Actual values may vary depending on the product and how it is processed.  High in potassium  The following foods and beverages have 200 mg or more of potassium per serving:  · Apricots (raw) - 2 have 200 mg of potassium.  · Apricots (dry) - 5 have 200 mg of potassium.  · Artichoke - 1 medium has 345 mg of potassium.  · Avocado - ¼ fruit has 245 mg of potassium.  · Banana - 1 medium fruit has 425 mg of potassium.  · [place] or baked beans (canned) - ½ cup has 280 mg of potassium.  · White beans (canned) - ½ cup has 595 mg potassium.  · Beef roast - 3 oz has 320 mg of potassium.  · Ground beef - 3 oz has 270 mg of potassium.  · Beets (raw or cooked) - ½ cup has 260 mg of potassium.  · Bran muffin - 2 oz has 300 mg of potassium.  · Broccoli (cooked) - ½ cup has 230 mg of potassium.  · [place] sprouts - ½ cup has 250 mg of potassium.  · Cantaloupe - ½ cup has 215 mg of potassium.  · Cereal, 100% bran - ½ cup has 200-400 mg of potassium.  · Cheeseburger -1 single fast food burger has 225-400 mg of potassium.  · Chicken - 3 oz has 220 mg of potassium.  · Clams (canned) - 3 oz has 535 mg of potassium.  · Crab - 3 oz has 225 mg of potassium.  · Dates - 5 have 270 mg of potassium.  · Dried beans and peas - ½ cup has 300-475 mg of potassium.  · Figs (dried) - 2 have 260 mg of potassium.  · Fish (halibut, tuna, cod, snapper) - 3 oz has 480 mg of potassium.  · Fish (salmon, nayely, swordfish, perch) - 3 oz has 300 mg of potassium.  · Fish (tuna, canned) - 3 oz has 200 mg of potassium.  · French fries (fast food) - 3 oz has 470 mg of potassium.  · Granola with fruit and nuts - ½ cup has 200 mg of potassium.  · Grapefruit juice - ½ cup has 200 mg of potassium.  · Honeydew melon - ½ cup has 200 mg of potassium.  · Kale (raw) - 1 cup has 300 mg of potassium.  · Kiwi - 1 medium fruit has 240 mg of potassium.  · Sayda,  rutabaga, parsnips - ½ cup has 280 mg of potassium.  · Lentils - ½ cup has 365 mg of potassium.  · [place] - 1 each has 325 mg of potassium.  · Milk (nonfat, low-fat, whole, buttermilk) - 1 cup has 350-380 mg of potassium.  · Milk (chocolate) - 1 cup has 420 mg of potassium  · Molasses - 1 Tbsp has 295 mg of potassium.  · Mushrooms - ½ cup has 280 mg of potassium.  · Nectarine - 1 each has 275 mg of potassium.  · Nuts (almonds, peanuts, hazelnuts, [place], cashew, mixed) - 1 oz has 200 mg of potassium.  · Nuts (pistachios) - 1 oz has 295 mg of potassium.  · Orange - 1 fruit has 240 mg of potassium.  · Orange juice - ½ cup has 235 mg of potassium.  · Papaya - ½ medium fruit has 390 mg of potassium.  · Peanut butter (chunky) - 2 Tbsp has 240 mg of potassium.  · Peanut butter (smooth) - 2 Tbsp has 210 mg of potassium.  · Pear - 1 medium (200 mg) of potassium.  · Pomegranate - 1 whole fruit has 400 mg of potassium.  · Pomegranate juice - ½ cup has 215 mg of potassium.  · Pork - 3 oz has 350 mg of potassium.  · Potato chips (salted) - 1 oz has 465 mg of potassium.  · Potato (baked with skin) - 1 medium has 925 mg of potassium.  · Potato (boiled) - ½ cup has 255 mg of potassium.  · Potato (Mashed) - ½ cup has 330 mg of potassium.  · Prune juice - ½ cup has 370 mg of potassium.  · Prunes - 5 have 305 mg of potassium.  · Pudding (chocolate) - ½ cup has 230 mg of potassium.  · Pumpkin (canned) - ½ cup has 250 mg of potassium.  · Raisins (seedless) - ¼ cup has 270 mg of potassium.  · Seeds (sunflower or pumpkin) - 1 oz has 240 mg of potassium.  · Soy milk - 1 cup has 300 mg of potassium.  · Spinach (cooked) - 1/2 cup has 420 mg of potassium.  · Spinach (canned) - ½ cup has 370 mg of potassium.  · Sweet potato (baked with skin) - 1 medium has 450 mg of potassium.  · Swiss chard - ½ cup has 480 mg of potassium.  · Tomato or vegetable juice - ½ cup has 275 mg of potassium.  · Tomato (sauce or puree) - ½ cup has 400-550 mg of  potassium.  · Tomato (raw) - 1 medium has 290 mg of potassium.  · Tomato (canned) - ½ cup has 200-300 mg of potassium.  · Turkey - 3 oz has 250 mg of potassium.  · Wheat germ - 1 oz has 250 mg of potassium.  · Winter squash - ½ cup has 250 mg of potassium.  · Yogurt (plain or fruited) - 6 oz has 260-435 mg of potassium.  · Zucchini - ½ cup has 220 mg of potassium.  Moderate in potassium  The following foods and beverages have  mg of potassium per serving:  · Apple - 1 fruit has 150 mg of potassium  · Apple juice - ½ cup has 150 mg of potassium  · Applesauce - ½ cup has 90 mg of potassium  · Apricot nectar - ½ cup has 140 mg of potassium  · Asparagus (small [place]) - ½ cup has 155 mg of potassium  · Asparagus (large [place]) - 6 have 155 mg of potassium  · Bagel (cinnamon raisin) - 1 four-inch bagel has 130 mg of potassium  · Bagel (egg or plain) - 1 four- inch bagel has 70 mg of potassium  · Beans (green) - ½ cup has 90 mg of potassium  · Beans (yellow) - ½ cup has 190 mg of potassium  · Beer, regular - 12 oz has 100 mg of potassium  · Beets (canned) - ½ cup has 125 mg of potassium  · Blackberries - ½ cup has 115 mg of potassium  · Blueberries - ½ cup has 60 mg of potassium  · Bread (whole wheat) - 1 slice has 70 mg of potassium  · Broccoli (raw) - ½ cup has 145 mg of potassium  · Cabbage - ½ cup has 150 mg of potassium  · Carrots (cooked or raw) - ½ cup has 180 mg of potassium  · Cauliflower (raw) - ½ cup has 150 mg of potassium  · Celery (raw) - ½ cup has 155 mg of potassium  · Cereal, bran flakes - ½ cup has 120-150 mg of potassium  · Cheese (cottage) - ½ cup has 110 mg of potassium  · Cherries - 10 have 150 mg of potassium  · Chocolate - 1½ oz bar has 165 mg of potassium  · Coffee (brewed) - 6 oz has 90 mg of potassium  · Corn - ½ cup or 1 ear has 195 mg of potassium  · Cucumbers - ½ cup has 80 mg of potassium  · Egg - 1 large egg has 60 mg of potassium  · Eggplant - ½ cup has 60 mg of potassium  · Endive  (raw) - ½ cup has 80 mg of potassium  · English muffin - 1 has 65 mg of potassium  · Fish (ocean perch) - 3 oz has 192 mg of potassium  · Frankfurter, beef or pork - 1 has 75 mg of potassium  · Fruit cocktail - ½ cup has 115 mg of potassium  · Grape juice - ½ cup has 170 mg of potassium  · Grapefruit - ½ fruit has 175 mg of potassium  · Grapes - ½ cup has 155 mg of potassium  · Greens: kale, turnip, yessenia - ½ cup has 110-150 mg of potassium  · Ice cream or frozen yogurt (chocolate) - ½ cup has 175 mg of potassium  · Ice cream or frozen yogurt (vanilla) - ½ cup has 120-150 mg of potassium  · Madeline, limes - 1 each has 80 mg of potassium  · Lettuce - 1 cup has 100 mg of potassium  · Mixed vegetables - ½ cup has 150 mg of potassium  · Mushrooms, raw - ½ cup has 110 mg of potassium  · Nuts (walnuts, pecans, or macadamia) - 1 oz has 125 mg of potassium  · Oatmeal - ½ cup has 80 mg of potassium  · Okra - ½ cup has 110 mg of potassium  · Onions - ½ cup has 120 mg of potassium  · Peach - 1 has 185 mg of potassium  · Peaches (canned) - ½ cup has 120 mg of potassium  · Pears (canned) - ½ cup has 120 mg of potassium  · Peas, green (frozen) - ½ cup has 90 mg of potassium  · Peppers (Green) - ½ cup has 130 mg of potassium  · Peppers (Red) - ½ cup has 160 mg of potassium  · Pineapple juice - ½ cup has 165 mg of potassium  · Pineapple (fresh or canned) - ½ cup has 100 mg of potassium  · Plums - 1 has 105 mg of potassium  · Pudding, vanilla - ½ cup has 150 mg of potassium  · Raspberries - ½ cup has 90 mg of potassium  · Rhubarb - ½ cup has 115 mg of potassium  · Rice, wild - ½ cup has 80 mg of potassium  · Shrimp - 3 oz has 155 mg of potassium  · Spinach (raw) - 1 cup has 170 mg of potassium  · Strawberries - ½ cup has 125 mg of potassium  · Summer squash - ½ cup has 175-200 mg of potassium  · Swiss chard (raw) - 1 cup has 135 mg of potassium  · Tangerines - 1 fruit has 140 mg of potassium  · Tea, brewed - 6 oz has 65 mg of  potassium  · Turnips - ½ cup has 140 mg of potassium  · Watermelon - ½ cup has 85 mg of potassium  · Wine (Red, table) - 5 oz has 180 mg of potassium  · Wine (White, table) - 5 oz 100 mg of potassium  Low in potassium  The following foods and beverages have less than 50 mg of potassium per serving.  · Bread (white) - 1 slice has 30 mg of potassium  · Carbonated beverages - 12 oz has less than 5 mg of potassium  · Cheese - 1 oz has 20-30 mg of potassium  · Cranberries - ½ cup has 45 mg of potassium  · Cranberry juice cocktail - ½ cup has 20 mg of potassium  · Fats and oils - 1 Tbsp has less than 5 mg of potassium  · Hummus - 1 Tbsp has 32 mg of potassium  · Nectar (papaya, stiven, or pear) - ½ cup has 35 mg of potassium  · Rice (white or brown) - ½ cup has 50 mg of potassium  · Spaghetti or macaroni (cooked) - ½ cup has 30 mg of potassium  · Tortilla, flour or corn - 1 has 50 mg of potassium  · Waffle - 1 four-inch waffle has 50 mg of potassium  · Water chestnuts - ½ cup has 40 mg of potassium  Summary  · Potassium is a mineral found in many foods and drinks. It affects how the heart works, and helps keep fluids and minerals balanced in the body.  · The amount of potassium you need each day depends on your age and any existing medical conditions you may have. Your health care provider or dietitian may recommend an amount of potassium that you should have each day.  This information is not intended to replace advice given to you by your health care provider. Make sure you discuss any questions you have with your health care provider.  Document Released: 08/01/2006 Document Revised: 11/30/2018 Document Reviewed: 03/14/2018  [employer]vier Patient Education © 2020 [employer]

## 2020-10-23 NOTE — ASSESSMENT & PLAN NOTE
Relatively asymptomatic. Continue low dose beta-blocker. Continue to avoid stimulants and decongestants. TSH normal. Encouraged him to keep scheduled f/u with Dr. Major.